# Patient Record
Sex: MALE | Race: BLACK OR AFRICAN AMERICAN
[De-identification: names, ages, dates, MRNs, and addresses within clinical notes are randomized per-mention and may not be internally consistent; named-entity substitution may affect disease eponyms.]

---

## 2019-08-02 ENCOUNTER — HOSPITAL ENCOUNTER (OUTPATIENT)
Dept: HOSPITAL 62 - RAD | Age: 59
End: 2019-08-02
Attending: UROLOGY
Payer: SELF-PAY

## 2019-08-02 DIAGNOSIS — R97.20: Primary | ICD-10-CM

## 2019-08-02 PROCEDURE — A9561 TC99M OXIDRONATE: HCPCS

## 2019-08-02 PROCEDURE — 78306 BONE IMAGING WHOLE BODY: CPT

## 2019-08-02 NOTE — RADIOLOGY REPORT (SQ)
EXAM DESCRIPTION:  NM WHOLE BODY BONE SCAN



COMPLETED DATE/TIME:  8/2/2019 12:55 pm



REASON FOR STUDY:  ELEVATED PROSTATE SPECIFIC ANTIGEN PSA R97.20  ELEVATED PROSTATE SPECIFIC ANTIGEN 
PSA



COMPARISON:  No available imaging studies for comparison.



RADIONUCLIDE AND DOSE:  20 millicuries Tc99m HDP.

The route of agent administration: Intravenous.



ADDITIONAL DRUGS AND DOSES:  None.



TECHNIQUE:  Routine delayed images at 3 hours post radionuclide injection acquired of the bony skelet
on including anterior and posterior whole-body projections and additional focused images as needed.



LIMITATIONS:  None.



FINDINGS:  BONES: Normal visualization without areas of photopenia or increased bony uptake of radiop
harmaceutical.

KIDNEYS: Symmetric excretion without obstruction.

OTHER: No other significant finding.



IMPRESSION:  NORMAL BONE SCAN.



COMMENT:  Quality measure 147:  No available prior imaging studies for comparison



TECHNICAL DOCUMENTATION:  JOB ID:  [id number]

 2011 [employer]- All Rights Reserved



Reading location - IP/workstation name: [email or web address]